# Patient Record
Sex: MALE | Race: WHITE | Employment: OTHER | URBAN - METROPOLITAN AREA
[De-identification: names, ages, dates, MRNs, and addresses within clinical notes are randomized per-mention and may not be internally consistent; named-entity substitution may affect disease eponyms.]

---

## 2019-01-07 ENCOUNTER — HOSPITAL ENCOUNTER (OUTPATIENT)
Dept: BONE DENSITY | Age: 84
Discharge: HOME | End: 2019-01-07
Attending: INTERNAL MEDICINE
Payer: MEDICARE

## 2019-01-07 DIAGNOSIS — M81.0 OSTEOPOROSIS: ICD-10-CM

## 2019-01-07 PROCEDURE — 77080 DXA BONE DENSITY AXIAL: CPT

## 2019-09-25 ENCOUNTER — APPOINTMENT (OUTPATIENT)
Dept: CT IMAGING | Age: 84
End: 2019-09-25
Attending: EMERGENCY MEDICINE
Payer: MEDICARE

## 2019-09-25 ENCOUNTER — HOSPITAL ENCOUNTER (EMERGENCY)
Age: 84
Discharge: HOME | End: 2019-09-25
Attending: EMERGENCY MEDICINE
Payer: MEDICARE

## 2019-09-25 ENCOUNTER — APPOINTMENT (OUTPATIENT)
Dept: GENERAL RADIOLOGY | Age: 84
End: 2019-09-25
Attending: EMERGENCY MEDICINE
Payer: MEDICARE

## 2019-09-25 VITALS
RESPIRATION RATE: 15 BRPM | DIASTOLIC BLOOD PRESSURE: 82 MMHG | OXYGEN SATURATION: 100 % | WEIGHT: 176.15 LBS | SYSTOLIC BLOOD PRESSURE: 179 MMHG | HEART RATE: 67 BPM | TEMPERATURE: 97.5 F | HEIGHT: 69 IN | BODY MASS INDEX: 26.09 KG/M2

## 2019-09-25 DIAGNOSIS — G45.4 TRANSIENT GLOBAL AMNESIA: Primary | ICD-10-CM

## 2019-09-25 DIAGNOSIS — R41.0 CONFUSION: ICD-10-CM

## 2019-09-25 LAB
ALBUMIN SERPL-MCNC: 3.5 G/DL (ref 3.5–5)
ALBUMIN/GLOB SERPL: 0.8 {RATIO} (ref 1.1–2.2)
ALP SERPL-CCNC: 55 U/L (ref 45–117)
ALT SERPL-CCNC: 37 U/L (ref 12–78)
AMORPH CRY URNS QL MICRO: ABNORMAL
ANION GAP SERPL CALC-SCNC: 3 MMOL/L (ref 5–15)
APPEARANCE UR: CLEAR
AST SERPL-CCNC: 30 U/L (ref 15–37)
BACTERIA URNS QL MICRO: ABNORMAL /HPF
BASOPHILS # BLD: 0.1 K/UL (ref 0–0.1)
BASOPHILS NFR BLD: 1 % (ref 0–1)
BILIRUB SERPL-MCNC: 1.1 MG/DL (ref 0.2–1)
BILIRUB UR QL: NEGATIVE
BUN SERPL-MCNC: 15 MG/DL (ref 6–20)
BUN/CREAT SERPL: 11 (ref 12–20)
CALCIUM SERPL-MCNC: 8.7 MG/DL (ref 8.5–10.1)
CHLORIDE SERPL-SCNC: 105 MMOL/L (ref 97–108)
CO2 SERPL-SCNC: 31 MMOL/L (ref 21–32)
COLOR UR: ABNORMAL
CREAT SERPL-MCNC: 1.35 MG/DL (ref 0.7–1.3)
DIFFERENTIAL METHOD BLD: ABNORMAL
EOSINOPHIL # BLD: 0.1 K/UL (ref 0–0.4)
EOSINOPHIL NFR BLD: 1 % (ref 0–7)
EPITH CASTS URNS QL MICRO: ABNORMAL /LPF
ERYTHROCYTE [DISTWIDTH] IN BLOOD BY AUTOMATED COUNT: 12.8 % (ref 11.5–14.5)
GLOBULIN SER CALC-MCNC: 4.3 G/DL (ref 2–4)
GLUCOSE SERPL-MCNC: 83 MG/DL (ref 65–100)
GLUCOSE UR STRIP.AUTO-MCNC: NEGATIVE MG/DL
HCT VFR BLD AUTO: 42 % (ref 36.6–50.3)
HGB BLD-MCNC: 13.8 G/DL (ref 12.1–17)
HGB UR QL STRIP: NEGATIVE
HYALINE CASTS URNS QL MICRO: ABNORMAL /LPF (ref 0–5)
IMM GRANULOCYTES # BLD AUTO: 0 K/UL (ref 0–0.04)
IMM GRANULOCYTES NFR BLD AUTO: 0 % (ref 0–0.5)
KETONES UR QL STRIP.AUTO: NEGATIVE MG/DL
LEUKOCYTE ESTERASE UR QL STRIP.AUTO: ABNORMAL
LYMPHOCYTES # BLD: 2.3 K/UL (ref 0.8–3.5)
LYMPHOCYTES NFR BLD: 31 % (ref 12–49)
MCH RBC QN AUTO: 33.5 PG (ref 26–34)
MCHC RBC AUTO-ENTMCNC: 32.9 G/DL (ref 30–36.5)
MCV RBC AUTO: 101.9 FL (ref 80–99)
MONOCYTES # BLD: 1 K/UL (ref 0–1)
MONOCYTES NFR BLD: 13 % (ref 5–13)
NEUTS SEG # BLD: 4 K/UL (ref 1.8–8)
NEUTS SEG NFR BLD: 54 % (ref 32–75)
NITRITE UR QL STRIP.AUTO: NEGATIVE
NRBC # BLD: 0 K/UL (ref 0–0.01)
NRBC BLD-RTO: 0 PER 100 WBC
PH UR STRIP: 5.5 [PH] (ref 5–8)
PLATELET # BLD AUTO: 171 K/UL (ref 150–400)
PMV BLD AUTO: 10.5 FL (ref 8.9–12.9)
POTASSIUM SERPL-SCNC: 4.7 MMOL/L (ref 3.5–5.1)
PROT SERPL-MCNC: 7.8 G/DL (ref 6.4–8.2)
PROT UR STRIP-MCNC: NEGATIVE MG/DL
RBC # BLD AUTO: 4.12 M/UL (ref 4.1–5.7)
RBC #/AREA URNS HPF: ABNORMAL /HPF (ref 0–5)
SODIUM SERPL-SCNC: 139 MMOL/L (ref 136–145)
SP GR UR REFRACTOMETRY: 1.02 (ref 1–1.03)
TROPONIN I SERPL-MCNC: <0.05 NG/ML
UA: UC IF INDICATED,UAUC: ABNORMAL
UROBILINOGEN UR QL STRIP.AUTO: 0.2 EU/DL (ref 0.2–1)
WBC # BLD AUTO: 7.5 K/UL (ref 4.1–11.1)
WBC URNS QL MICRO: ABNORMAL /HPF (ref 0–4)

## 2019-09-25 PROCEDURE — 74011636320 HC RX REV CODE- 636/320: Performed by: EMERGENCY MEDICINE

## 2019-09-25 PROCEDURE — 71046 X-RAY EXAM CHEST 2 VIEWS: CPT

## 2019-09-25 PROCEDURE — 36415 COLL VENOUS BLD VENIPUNCTURE: CPT

## 2019-09-25 PROCEDURE — 93005 ELECTROCARDIOGRAM TRACING: CPT

## 2019-09-25 PROCEDURE — 81001 URINALYSIS AUTO W/SCOPE: CPT

## 2019-09-25 PROCEDURE — 70450 CT HEAD/BRAIN W/O DYE: CPT

## 2019-09-25 PROCEDURE — 70496 CT ANGIOGRAPHY HEAD: CPT

## 2019-09-25 PROCEDURE — 99283 EMERGENCY DEPT VISIT LOW MDM: CPT

## 2019-09-25 PROCEDURE — 84484 ASSAY OF TROPONIN QUANT: CPT

## 2019-09-25 PROCEDURE — 87086 URINE CULTURE/COLONY COUNT: CPT

## 2019-09-25 PROCEDURE — 85025 COMPLETE CBC W/AUTO DIFF WBC: CPT

## 2019-09-25 PROCEDURE — 80053 COMPREHEN METABOLIC PANEL: CPT

## 2019-09-25 RX ORDER — METOPROLOL SUCCINATE 50 MG/1
TABLET, EXTENDED RELEASE ORAL
Refills: 0 | COMMUNITY
Start: 2019-08-06

## 2019-09-25 RX ORDER — SODIUM CHLORIDE 0.9 % (FLUSH) 0.9 %
10 SYRINGE (ML) INJECTION
Status: COMPLETED | OUTPATIENT
Start: 2019-09-25 | End: 2019-09-25

## 2019-09-25 RX ADMIN — Medication 10 ML: at 14:24

## 2019-09-25 RX ADMIN — IOPAMIDOL 100 ML: 755 INJECTION, SOLUTION INTRAVENOUS at 14:24

## 2019-09-25 NOTE — ED NOTES
Monse Gama MD   has done a bedside review of the discharge instructions. The patient is in understanding. The patients line(s) are removed. The patient is dressed, and belongings together for discharge.

## 2019-09-25 NOTE — ED NOTES
The patient presents to the ED with complaints of yesterday being out and about in [place], and had an episode of disorientation and confusion, reports eyes felt out of balance. Reports that they werent working together. Patient reports lasting for an hour. Patient reports by morning time he felt back to normal, reports nausea last night.

## 2019-09-25 NOTE — ED PROVIDER NOTES
EMERGENCY DEPARTMENT HISTORY AND PHYSICAL EXAM      Date: 9/25/2019  Patient Name: Idalia Blackwell  Patient Age and Sex: 80 y.o. male    History of Presenting Illness     Chief Complaint   Patient presents with    Altered mental status     pt had a period for about 30-60 minutes at 3pm yesterday where he had blurred vision and was only able to see out of one eye and was not aware where he was at and what he was doing       History Provided By: Patient    HPI: Idalia Blackwell, is a 80 y.o. male past medical history significant for atherosclerosis, coronary artery disease, GERD presents for an evaluation of an acute and transient episode of confusion and blurry vision he experienced yesterday afternoon. The patient was around [place] with his wife and they were about to go home when he suddenly could not remember how to drive home any longer or where he lived. He also had blurry vision describing it \"as if someone gave me a different lens. \" He had no weakness, no dysarthria or aphasia, no focal neurologic complaints. Also had no fevers or chills. No recent trauma or falls. After about 30 minutes his symptoms began improving and he was able to get him self home. After 1 hour all of his symptoms resolved. He went to bed early last evening and woke up this morning feeling completely fine. He called his doctor to let them know what happened and his doctor asking him to get evaluated as soon as possible. Pt denies any other alleviating or exacerbating factors. There are no other complaints, changes or physical findings at this time.      Past Medical History:   Diagnosis Date    AAA (abdominal aortic aneurysm) (HCC)     Aneurysm (HCC)     AAA endograth    Arrhythmia     heart murmur    Arthritis     CAD (coronary artery disease)     GERD (gastroesophageal reflux disease)     Hypertension     Other ill-defined conditions(119.89)     peripheral vascular disease    Other ill-defined conditions(039.06) peripheral neuropathy bilateral feet and ankles    Pulmonary embolism ([address] 2007    Unspecified adverse effect of anesthesia     hard time waking up after wrist operation     Past Surgical History:   Procedure Laterality Date    CARDIAC SURG PROCEDURE UNLIST      stent    HX AAA REPAIR      HX CATARACT REMOVAL      bilateral    HX HERNIA REPAIR  10/1/14    open right inguinal with mesh    HX ORTHOPAEDIC      left wrist fixation    HX ORTHOPAEDIC Left 2013    left hip replacement    HX ORTHOPAEDIC      5 vertebrae broken(kryoplasty) and 1 rib broken    HX RETINAL DETACHMENT REPAIR      bialteral    HX UROLOGICAL      right testicle removal       PCP: Abril Osorio MD    Past History   Past Medical History:  Past Medical History:   Diagnosis Date    AAA (abdominal aortic aneurysm) ([address]     Aneurysm ([address]     AAA endograth    Arrhythmia     heart murmur    Arthritis     CAD (coronary artery disease)     GERD (gastroesophageal reflux disease)     Hypertension     Other ill-defined conditions(799.89)     peripheral vascular disease    Other ill-defined conditions(799.89)     peripheral neuropathy bilateral feet and ankles    Pulmonary embolism ([address] 2007    Unspecified adverse effect of anesthesia     hard time waking up after wrist operation       Past Surgical History:  Past Surgical History:   Procedure Laterality Date    CARDIAC SURG PROCEDURE UNLIST      stent    HX AAA REPAIR      HX CATARACT REMOVAL      bilateral    HX HERNIA REPAIR  10/1/14    open right inguinal with mesh    HX ORTHOPAEDIC      left wrist fixation    HX ORTHOPAEDIC Left 2013    left hip replacement    HX ORTHOPAEDIC      5 vertebrae broken(kryoplasty) and 1 rib broken    HX RETINAL DETACHMENT REPAIR      bialteral    HX UROLOGICAL      right testicle removal       Family History:  Family History   Problem Relation Age of Onset    Stroke Father     Other Mother         myotonic dystrophy       Social History:  Social History     Tobacco Use    Smoking status: Former Smoker     Last attempt to quit: 3/22/1981     Years since quittin.5    Smokeless tobacco: Never Used   Substance Use Topics    Alcohol use: No     Alcohol/week: 0.0 standard drinks    Drug use: No       Allergies:  No Known Allergies    Current Medications:  No current facility-administered medications on file prior to encounter. Current Outpatient Medications on File Prior to Encounter   Medication Sig Dispense Refill    metoprolol succinate (TOPROL-XL) 50 mg XL tablet TAKE 1 TABLET BY MOUTH EVERY DAY  0    testosterone cypionate (DEPO-TESTOSTERONE) 200 mg/mL injection 100 mg by IntraMUSCular route every fourteen (14) days. Indications: Once every three weeks      multivitamin (ONE A DAY) tablet Take 1 Tab by mouth daily.  atorvastatin (LIPITOR) 20 mg tablet Take 20 mg by mouth daily.  aspirin 81 mg chewable tablet Take 81 mg by mouth daily.  dorzolamide (TRUSOPT) 2 % ophthalmic solution Administer 1 Drop to right eye two (2) times a day.  latanoprost (XALATAN) 0.005 % ophthalmic solution Administer 1 Drop to both eyes nightly.  pantoprazole (PROTONIX) 20 mg tablet Take 40 mg by mouth daily. Review of Systems   Review of Systems   Constitutional: Negative for appetite change, chills and fever. Eyes: Positive for visual disturbance. Respiratory: Negative for cough, chest tightness and shortness of breath. Cardiovascular: Negative for chest pain, palpitations and leg swelling. Gastrointestinal: Negative for abdominal distention, abdominal pain, blood in stool, constipation, diarrhea, nausea and vomiting. Genitourinary: Negative for decreased urine volume, difficulty urinating, dysuria, flank pain, frequency and hematuria. Musculoskeletal: Negative for arthralgias, joint swelling, myalgias, neck pain and neck stiffness.    Neurological: Negative for dizziness, seizures, facial asymmetry, speech difficulty, weakness, light-headedness, numbness and headaches. Hematological: Negative for adenopathy. Psychiatric/Behavioral: Positive for confusion. Negative for hallucinations. All other systems reviewed and are negative. Physical Exam   Physical Exam   Constitutional: He is oriented to person, place, and time. He appears well-developed and well-nourished. He does not appear ill. No distress. HENT:   Head: Atraumatic. Right Ear: External ear normal.   Left Ear: External ear normal.   Mouth/Throat: Oropharynx is clear and moist and mucous membranes are normal.   Eyes: Pupils are equal, round, and reactive to light. Conjunctivae and EOM are normal. No scleral icterus. Neck: Normal range of motion and full passive range of motion without pain. Neck supple. No JVD present. No spinous process tenderness present. Carotid bruit is not present. No thyroid mass and no thyromegaly present. Cardiovascular: Normal rate, regular rhythm, normal heart sounds, intact distal pulses and normal pulses. No murmur heard. Pulmonary/Chest: Effort normal and breath sounds normal. No respiratory distress. He exhibits no tenderness and no crepitus. Abdominal: Soft. Normal appearance and bowel sounds are normal. He exhibits no distension. There is no hepatosplenomegaly. There is no tenderness. Musculoskeletal: Normal range of motion. He exhibits no edema, tenderness or deformity. Lymphadenopathy:     He has no cervical adenopathy. Neurological: He is alert and oriented to person, place, and time. He has normal strength and normal reflexes. No cranial nerve deficit or sensory deficit. Skin: Skin is warm and dry. He is not diaphoretic. Psychiatric: He has a normal mood and affect. Nursing note and vitals reviewed.       Diagnostic Study Results     Labs -  Recent Results (from the past 24 hour(s))   CBC WITH AUTOMATED DIFF    Collection Time: 09/25/19 12:40 PM   Result Value Ref Range    WBC 7.5 4.1 - 11.1 K/uL    RBC 4.12 4.10 - 5.70 M/uL    HGB 13.8 12.1 - 17.0 g/dL    HCT 42.0 36.6 - 50.3 %    .9 (H) 80.0 - 99.0 FL    MCH 33.5 26.0 - 34.0 PG    MCHC 32.9 30.0 - 36.5 g/dL    RDW 12.8 11.5 - 14.5 %    PLATELET 186 761 - 723 K/uL    MPV 10.5 8.9 - 12.9 FL    NRBC 0.0 0  WBC    ABSOLUTE NRBC 0.00 0.00 - 0.01 K/uL    NEUTROPHILS 54 32 - 75 %    LYMPHOCYTES 31 12 - 49 %    MONOCYTES 13 5 - 13 %    EOSINOPHILS 1 0 - 7 %    BASOPHILS 1 0 - 1 %    IMMATURE GRANULOCYTES 0 0.0 - 0.5 %    ABS. NEUTROPHILS 4.0 1.8 - 8.0 K/UL    ABS. LYMPHOCYTES 2.3 0.8 - 3.5 K/UL    ABS. MONOCYTES 1.0 0.0 - 1.0 K/UL    ABS. EOSINOPHILS 0.1 0.0 - 0.4 K/UL    ABS. BASOPHILS 0.1 0.0 - 0.1 K/UL    ABS. IMM. GRANS. 0.0 0.00 - 0.04 K/UL    DF AUTOMATED     METABOLIC PANEL, COMPREHENSIVE    Collection Time: 09/25/19 12:40 PM   Result Value Ref Range    Sodium 139 136 - 145 mmol/L    Potassium 4.7 3.5 - 5.1 mmol/L    Chloride 105 97 - 108 mmol/L    CO2 31 21 - 32 mmol/L    Anion gap 3 (L) 5 - 15 mmol/L    Glucose 83 65 - 100 mg/dL    BUN 15 6 - 20 MG/DL    Creatinine 1.35 (H) 0.70 - 1.30 MG/DL    BUN/Creatinine ratio 11 (L) 12 - 20      GFR est AA 60 (L) >60 ml/min/1.73m2    GFR est non-AA 49 (L) >60 ml/min/1.73m2    Calcium 8.7 8.5 - 10.1 MG/DL    Bilirubin, total 1.1 (H) 0.2 - 1.0 MG/DL    ALT (SGPT) 37 12 - 78 U/L    AST (SGOT) 30 15 - 37 U/L    Alk.  phosphatase 55 45 - 117 U/L    Protein, total 7.8 6.4 - 8.2 g/dL    Albumin 3.5 3.5 - 5.0 g/dL    Globulin 4.3 (H) 2.0 - 4.0 g/dL    A-G Ratio 0.8 (L) 1.1 - 2.2     TROPONIN I    Collection Time: 09/25/19 12:40 PM   Result Value Ref Range    Troponin-I, Qt. <0.05 <0.05 ng/mL   URINALYSIS W/ REFLEX CULTURE    Collection Time: 09/25/19 12:48 PM   Result Value Ref Range    Color YELLOW/STRAW      Appearance CLEAR CLEAR      Specific gravity 1.018 1.003 - 1.030      pH (UA) 5.5 5.0 - 8.0      Protein NEGATIVE  NEG mg/dL    Glucose NEGATIVE  NEG mg/dL    Ketone NEGATIVE  NEG mg/dL Bilirubin NEGATIVE  NEG      Blood NEGATIVE  NEG      Urobilinogen 0.2 0.2 - 1.0 EU/dL    Nitrites NEGATIVE  NEG      Leukocyte Esterase SMALL (A) NEG      WBC 5-10 0 - 4 /hpf    RBC 0-5 0 - 5 /hpf    Epithelial cells FEW FEW /lpf    Bacteria 1+ (A) NEG /hpf    UA:UC IF INDICATED URINE CULTURE ORDERED (A) CNI      Amorphous Crystals 1+ (A) NEG    Hyaline cast 0-2 0 - 5 /lpf   EKG, 12 LEAD, INITIAL    Collection Time: 09/25/19  2:04 PM   Result Value Ref Range    Ventricular Rate 62 BPM    Atrial Rate 62 BPM    P-R Interval 130 ms    QRS Duration 84 ms    Q-T Interval 384 ms    QTC Calculation (Bezet) 389 ms    Calculated P Axis 14 degrees    Calculated R Axis 12 degrees    Calculated T Axis 68 degrees    Diagnosis       Normal sinus rhythm  Possible Left atrial enlargement  Left ventricular hypertrophy  Nonspecific T wave abnormality  When compared with ECG of 08-MAY-2015 14:17,  No significant change was found         Radiologic Studies -   CT HEAD WO CONT   Final Result   IMPRESSION:    No acute intracranial process. Imaging findings consistent with mild chronic microvascular ischemic change. There is a moderate degree of cerebral atrophy. CTA HEAD NECK W CONT   Final Result   IMPRESSION:       There is no major vessel occlusion. No acute intracranial process. Mild diffuse atherosclerotic cerebral vasculopathy with mild/moderate stenoses   at the origin of the vertebral arteries. Mild proximal right internal carotid artery stenosis. .      XR CHEST PA LAT   Final Result   IMPRESSION: No acute findings            Medical Decision Making   I am the first provider for this patient. Records Reviewed: I reviewed our electronic medical record system for any past medical records that were available that may contribute to the patient's current condition, including their PMH, surgical history, social and family history. Reviewed the nursing notes and vital signs from today's visit.      Vital Signs-Reviewed the patient's vital signs. Patient Vitals for the past 24 hrs:   Temp Pulse Resp BP SpO2   09/25/19 1217 97.5 °F (36.4 °C) 67 15 179/82 100 %       ED ECG interpretation:  ECG shows normal sinus rhythm, with HR 62, normal intervals and no ST changes concerning for ischemia. This ECG was interpreted by Angel Owens M.D. Provider Notes (Medical Decision Making): The patient is a very well appearing and pleasant 70-year-old gentleman who presents to the emergency room after experiencing an episode of significant confusion yesterday afternoon. All these symptoms have resolved and he currently has a completely normal neurologic exam as well as rest of physical exam.  DDX: Transient global amnesia, TIA, cerebral vasospasm, CVA. His basic metabolic and infectious panels are both normal.  His CTA of head and neck shows known atherosclerotic disease but no acute occlusions or other findings. Carotids were also visualized during the study. The patient takes a daily baby aspirin and I have asked him to continue doing so. I have engaged him and his wife in joint decision-making and given the resolution of all symptoms both he and his wife would prefer to go home rather than be admitted for a TIA work-up. He conveys full understanding of what symptoms to look out for that would suggest stroke as well as the importance of coming to the hospital by calling 911 immediately this time and the situations is of essence. As the CTA today is negative and he has been asymptomatic since yesterday, I believe it is reasonable for him to go home and follow-up with his primary care provider for an outpatient work-up. Dr. Masoud Mcfarlane is his primary, I will ensure that he is aware of the findings today and the fact that the patient is being discharged. ED Course:   Initial assessment performed.  The patients presenting problems have been discussed, and they are in agreement with the care plan formulated and outlined with them. I have encouraged them to ask questions as they arise throughout their visit. Progress note:  Patient has been reassessed and reports feeling considerably better, has normal vital signs and feels comfortable going home. I think this is reasonable as no findings today suggest a life-threatening condition. DISPOSITION: DISCHARGE  The patient's results have been reviewed with patient and available family and/or caregiver. They verbally convey their understanding and agreement of the patient's signs, symptoms, diagnosis, treatment and prognosis and additionally agree to follow up as recommended in the discharge instructions or to return to the Emergency Department should the patient's condition change prior to their follow-up appointment. The patient and available family and/or caregiver verbally agree with the care plan and all of their questions have been answered. The discharge instructions have also been provided to the them with educational information regarding the patient's diagnosis as well a list of reasons why the patient would want to return to the ER prior to their follow-up appointment should any concerns arise, the patient's condition change or symptoms worsen. Kemar Quezada MD, MSc      Diagnosis     Clinical Impression:   1. Transient global amnesia    2. Confusion        Attestation:  I personally performed the services described in this documentation on this date 9/25/2019 for patient Jerel Posada. Kemar Quezada MD    Please note that this dictation was completed with [employer], the computer voice recognition software. Quite often unanticipated grammatical, syntax, homophones, and other interpretive errors are inadvertently transcribed by the computer software. Please disregard these errors. Please excuse any errors that have escaped final proofreading.

## 2019-09-25 NOTE — DISCHARGE INSTRUCTIONS
Patient Education        Transient Global Amnesia: Care Instructions  Your Care Instructions    Transient global amnesia is a rare type of amnesia that causes sudden memory loss. When this happens you cannot remember events from your recent past or make new memories. You may also not know where you are, why you are there, or what the date is. You may even ask the same question many times. Unlike other types of amnesia, you do know who you are and you can recognize people that you know. An episode usually does not last more than 6 hours and it rarely happens again. What causes transient global amnesia is not fully known. But, in some cases, migraines, an intense workout, sex, or stress may cause an episode. Your doctor probably did an exam and ran some tests to rule out certain health problems that can also cause sudden memory loss, such as a stroke, brain tumor, seizure, head injury, or an infection. If your doctor did not find any of these things to be the cause of your memory loss, you will not need treatment and you can go back to your usual activities. Although you may never be able to remember what happened right before or during the episode, the rest of your memory should come back. Follow-up care is a key part of your treatment and safety. Be sure to make and go to all appointments, and call your doctor if you are having problems. It is also a good idea to know your test results and keep a list of the medicines you take. How can you care for yourself at home? · Take good care of yourself. Eat a balanced, low-fat diet. Get plenty of rest. Limit how much alcohol you drink. Do not smoke. If you need help quitting, talk to your doctor about stop-smoking programs and medicines. These can increase your chances of quitting for good. · Find healthy ways to deal with stress. Regular exercise is one of the best ways to manage stress. · Talk to a counselor if you're concerned about what happened.   When should you call for help? Call 911 anytime you think you may need emergency care. For example, call if:    · You passed out (lost consciousness).     · You have symptoms of a stroke. These may include:  ? Sudden numbness, tingling, weakness, or loss of movement in your face, arm, or leg, especially on only one side of your body. ? Sudden vision changes. ? Sudden trouble speaking. ? Sudden confusion or trouble understanding simple statements. ? Sudden problems with walking or balance. ? A sudden, severe headache that is different from past headaches.     · You develop a fever with a stiff neck or a severe headache.     · You do not know who you are or where you are.    Call your doctor now or seek immediate medical care if:    · You suddenly lose your memory again.     · You have a seizure.     · You are dizzy.     · You are more confused, forgetful, or upset than usual.     · You notice changes in your behavior or personality.     · You begin to have trouble with familiar things, such as how to read or how to tell time.    Watch closely for changes in your health, and be sure to contact your doctor if you have any problems. Where can you learn more? Go to http://cameron-smitha.info/. Enter V601 in the search box to learn more about \"Transient Global Amnesia: Care Instructions. \"  Current as of: March 28, 2019  Content Version: 12.2  © 9545-0913 "[employer]. Care instructions adapted under license by [employer] (which disclaims liability or warranty for this information). If you have questions about a medical condition or this instruction, always ask your healthcare professional. [place] [address] any warranty or liability for your use of this information.

## 2019-09-26 LAB
ATRIAL RATE: 62 BPM
CALCULATED P AXIS, ECG09: 14 DEGREES
CALCULATED R AXIS, ECG10: 12 DEGREES
CALCULATED T AXIS, ECG11: 68 DEGREES
DIAGNOSIS, 93000: NORMAL
P-R INTERVAL, ECG05: 130 MS
Q-T INTERVAL, ECG07: 384 MS
QRS DURATION, ECG06: 84 MS
QTC CALCULATION (BEZET), ECG08: 389 MS
VENTRICULAR RATE, ECG03: 62 BPM

## 2019-09-27 LAB
BACTERIA SPEC CULT: NORMAL
CC UR VC: NORMAL
SERVICE CMNT-IMP: NORMAL